# Patient Record
Sex: FEMALE | Race: WHITE
[De-identification: names, ages, dates, MRNs, and addresses within clinical notes are randomized per-mention and may not be internally consistent; named-entity substitution may affect disease eponyms.]

---

## 2020-03-02 ENCOUNTER — HOSPITAL ENCOUNTER (EMERGENCY)
Dept: HOSPITAL 26 - MED | Age: 50
LOS: 1 days | Discharge: HOME | End: 2020-03-03
Payer: MEDICAID

## 2020-03-02 VITALS — BODY MASS INDEX: 20.8 KG/M2 | WEIGHT: 113 LBS | HEIGHT: 62 IN

## 2020-03-02 VITALS — DIASTOLIC BLOOD PRESSURE: 76 MMHG | SYSTOLIC BLOOD PRESSURE: 123 MMHG

## 2020-03-02 DIAGNOSIS — D64.89: ICD-10-CM

## 2020-03-02 DIAGNOSIS — E87.6: Primary | ICD-10-CM

## 2020-03-02 DIAGNOSIS — R51: ICD-10-CM

## 2020-03-02 DIAGNOSIS — R11.2: ICD-10-CM

## 2020-03-02 LAB
APPEARANCE UR: (no result)
BILIRUB UR QL STRIP: NEGATIVE
COLOR UR: YELLOW
GLUCOSE UR STRIP-MCNC: NEGATIVE MG/DL
HGB UR QL STRIP: NEGATIVE
LEUKOCYTE ESTERASE UR QL STRIP: NEGATIVE
NITRITE UR QL STRIP: NEGATIVE
PH UR STRIP: 7 [PH] (ref 5–9)

## 2020-03-02 PROCEDURE — 96375 TX/PRO/DX INJ NEW DRUG ADDON: CPT

## 2020-03-02 PROCEDURE — 96374 THER/PROPH/DIAG INJ IV PUSH: CPT

## 2020-03-02 PROCEDURE — 80053 COMPREHEN METABOLIC PANEL: CPT

## 2020-03-02 PROCEDURE — 36415 COLL VENOUS BLD VENIPUNCTURE: CPT

## 2020-03-02 PROCEDURE — 81025 URINE PREGNANCY TEST: CPT

## 2020-03-02 PROCEDURE — 74176 CT ABD & PELVIS W/O CONTRAST: CPT

## 2020-03-02 PROCEDURE — 96361 HYDRATE IV INFUSION ADD-ON: CPT

## 2020-03-02 PROCEDURE — 99284 EMERGENCY DEPT VISIT MOD MDM: CPT

## 2020-03-02 PROCEDURE — 83690 ASSAY OF LIPASE: CPT

## 2020-03-02 PROCEDURE — 81003 URINALYSIS AUTO W/O SCOPE: CPT

## 2020-03-02 PROCEDURE — 85025 COMPLETE CBC W/AUTO DIFF WBC: CPT

## 2020-03-02 NOTE — NUR
51 Y/O FEMALE PRESENTS WITH EPIGASTRIC PAIN/VOMITING/NAUSEA BEGINNING YEST 
MORNING. DENIES FEVER/ CHILLS. ABD SOFT/ NON DISTENDED/ NON TENDER. NO MASSES 
FELT UPON PALPATION. BOWEL SOUNDS NORMO ACTIVE IN ALL QUADRANTS. CAP REFILL<3. 
SKIN COOL, DRY, INTACT. DENIES SOB/CP. RESP EVEN AND UNLABORED. LUNG SOUNDS 
CLEAR IN BILAT LOBES. AAOX3. ABLE TO AMBULATE TO BED. 

NO PMH

NKA

## 2020-03-03 VITALS — SYSTOLIC BLOOD PRESSURE: 110 MMHG | DIASTOLIC BLOOD PRESSURE: 79 MMHG

## 2020-03-03 LAB
ALBUMIN FLD-MCNC: 3.9 G/DL (ref 3.4–5)
ANION GAP SERPL CALCULATED.3IONS-SCNC: 11.1 MMOL/L (ref 8–16)
AST SERPL-CCNC: 29 U/L (ref 15–37)
BASOPHILS # BLD AUTO: 0 K/UL (ref 0–0.22)
BASOPHILS NFR BLD AUTO: 0.2 % (ref 0–2)
BILIRUB SERPL-MCNC: 0.6 MG/DL (ref 0–1)
BUN SERPL-MCNC: 11 MG/DL (ref 7–18)
CHLORIDE SERPL-SCNC: 102 MMOL/L (ref 98–107)
CO2 SERPL-SCNC: 28.2 MMOL/L (ref 21–32)
CREAT SERPL-MCNC: 0.6 MG/DL (ref 0.6–1.3)
EOSINOPHIL # BLD AUTO: 0 K/UL (ref 0–0.4)
EOSINOPHIL NFR BLD AUTO: 0.3 % (ref 0–4)
ERYTHROCYTE [DISTWIDTH] IN BLOOD BY AUTOMATED COUNT: 18.9 % (ref 11.6–13.7)
GFR SERPL CREATININE-BSD FRML MDRD: 136 ML/MIN (ref 90–?)
GLUCOSE SERPL-MCNC: 106 MG/DL (ref 74–106)
HCT VFR BLD AUTO: 25 % (ref 36–48)
HGB BLD-MCNC: 7.9 G/DL (ref 12–16)
LIPASE SERPL-CCNC: 119 U/L (ref 73–393)
LYMPHOCYTES # BLD AUTO: 0.7 K/UL (ref 2.5–16.5)
LYMPHOCYTES NFR BLD AUTO: 8.1 % (ref 20.5–51.1)
MCH RBC QN AUTO: 20 PG (ref 27–31)
MCHC RBC AUTO-ENTMCNC: 32 G/DL (ref 33–37)
MCV RBC AUTO: 62.4 FL (ref 80–94)
MONOCYTES # BLD AUTO: 0.3 K/UL (ref 0.8–1)
MONOCYTES NFR BLD AUTO: 3.9 % (ref 1.7–9.3)
NEUTROPHILS # BLD AUTO: 7.1 K/UL (ref 1.8–7.7)
NEUTROPHILS NFR BLD AUTO: 87.5 % (ref 42.2–75.2)
PLATELET # BLD AUTO: 317 K/UL (ref 140–450)
POTASSIUM SERPL-SCNC: 3.3 MMOL/L (ref 3.5–5.1)
RBC # BLD AUTO: 4.01 MIL/UL (ref 4.2–5.4)
SODIUM SERPL-SCNC: 138 MMOL/L (ref 136–145)
WBC # BLD AUTO: 8.1 K/UL (ref 4.8–10.8)

## 2020-07-09 ENCOUNTER — HOSPITAL ENCOUNTER (EMERGENCY)
Dept: HOSPITAL 26 - MED | Age: 50
Discharge: HOME | End: 2020-07-09
Payer: MEDICAID

## 2020-07-09 VITALS — SYSTOLIC BLOOD PRESSURE: 122 MMHG | DIASTOLIC BLOOD PRESSURE: 63 MMHG

## 2020-07-09 VITALS — SYSTOLIC BLOOD PRESSURE: 122 MMHG | DIASTOLIC BLOOD PRESSURE: 72 MMHG

## 2020-07-09 VITALS — WEIGHT: 113 LBS | HEIGHT: 58 IN | BODY MASS INDEX: 23.72 KG/M2

## 2020-07-09 DIAGNOSIS — R50.9: ICD-10-CM

## 2020-07-09 DIAGNOSIS — U07.1: Primary | ICD-10-CM

## 2020-07-09 DIAGNOSIS — R05: ICD-10-CM

## 2020-07-09 DIAGNOSIS — M79.10: ICD-10-CM

## 2020-07-09 PROCEDURE — 71045 X-RAY EXAM CHEST 1 VIEW: CPT

## 2020-07-09 PROCEDURE — 99284 EMERGENCY DEPT VISIT MOD MDM: CPT

## 2020-07-09 NOTE — NUR
Pt with no med hx presents ambulatory to ED, c/o fever/bodyaches, cough, 
headache, CP and SOB x8 days. Reports that she has had exposure to positive 
COVID contacts as her brother in law tested positive 9 days ago. Pt awake and 
alert, skin normal color warm and dry, rr even and unlabored.

## 2020-07-09 NOTE — NUR
Patient discharged with v/s stable. Written and verbal after care instructions 
given and explained. 

Patient alert, oriented and verbalized understanding of instructions. 
Ambulatory with steady gait. All questions addressed prior to discharge. ID 
band removed. Patient advised to follow up with PMD. Rx of promethazine given. 
Patient educated on indication of medication including possible reaction and 
side effects. Opportunity to ask questions provided and answered.

## 2020-07-11 NOTE — NUR
LAB GAVE PT COPY OF COVID-19 POSITIVE RESULT AND THE PT COPY FORWARDED TO THE 
[place] CARLOS RAMIREZ RN.